# Patient Record
Sex: FEMALE | Race: WHITE | Employment: OTHER | URBAN - METROPOLITAN AREA
[De-identification: names, ages, dates, MRNs, and addresses within clinical notes are randomized per-mention and may not be internally consistent; named-entity substitution may affect disease eponyms.]

---

## 2019-09-06 ENCOUNTER — APPOINTMENT (OUTPATIENT)
Dept: GENERAL RADIOLOGY | Age: 79
DRG: 291 | End: 2019-09-06
Payer: MEDICARE

## 2019-09-06 ENCOUNTER — HOSPITAL ENCOUNTER (INPATIENT)
Age: 79
LOS: 1 days | Discharge: ANOTHER HOSPITAL | DRG: 291 | End: 2019-09-06
Attending: EMERGENCY MEDICINE | Admitting: HOSPITALIST
Payer: MEDICARE

## 2019-09-06 VITALS
TEMPERATURE: 97.8 F | RESPIRATION RATE: 21 BRPM | HEART RATE: 86 BPM | WEIGHT: 106.92 LBS | OXYGEN SATURATION: 100 % | HEIGHT: 60 IN | BODY MASS INDEX: 20.99 KG/M2 | DIASTOLIC BLOOD PRESSURE: 62 MMHG | SYSTOLIC BLOOD PRESSURE: 176 MMHG

## 2019-09-06 DIAGNOSIS — R79.89 ELEVATED D-DIMER: ICD-10-CM

## 2019-09-06 DIAGNOSIS — T50.905A ADVERSE EFFECT OF DRUG, INITIAL ENCOUNTER: ICD-10-CM

## 2019-09-06 DIAGNOSIS — J96.01 ACUTE RESPIRATORY FAILURE WITH HYPOXIA AND HYPERCAPNIA (HCC): Primary | ICD-10-CM

## 2019-09-06 DIAGNOSIS — N17.9 AKI (ACUTE KIDNEY INJURY) (HCC): ICD-10-CM

## 2019-09-06 DIAGNOSIS — R00.1 BRADYCARDIA: ICD-10-CM

## 2019-09-06 DIAGNOSIS — I50.9 ACUTE CONGESTIVE HEART FAILURE, UNSPECIFIED HEART FAILURE TYPE (HCC): ICD-10-CM

## 2019-09-06 DIAGNOSIS — J96.02 ACUTE RESPIRATORY FAILURE WITH HYPOXIA AND HYPERCAPNIA (HCC): Primary | ICD-10-CM

## 2019-09-06 DIAGNOSIS — I16.1 HYPERTENSIVE EMERGENCY: ICD-10-CM

## 2019-09-06 PROBLEM — J96.00 ACUTE RESPIRATORY FAILURE (HCC): Status: ACTIVE | Noted: 2019-09-06

## 2019-09-06 LAB
A/G RATIO: 1.2 (ref 1.1–2.2)
ALBUMIN SERPL-MCNC: 3.7 G/DL (ref 3.4–5)
ALP BLD-CCNC: 99 U/L (ref 40–129)
ALT SERPL-CCNC: 13 U/L (ref 10–40)
ANION GAP SERPL CALCULATED.3IONS-SCNC: 11 MMOL/L (ref 3–16)
AST SERPL-CCNC: 17 U/L (ref 15–37)
BASE EXCESS ARTERIAL: -1.3 MMOL/L (ref -3–3)
BASE EXCESS VENOUS: 1.6 MMOL/L
BASOPHILS ABSOLUTE: 0.1 K/UL (ref 0–0.2)
BASOPHILS RELATIVE PERCENT: 0.9 %
BILIRUB SERPL-MCNC: 0.5 MG/DL (ref 0–1)
BILIRUBIN URINE: NEGATIVE
BLOOD, URINE: ABNORMAL
BUN BLDV-MCNC: 39 MG/DL (ref 7–20)
CALCIUM SERPL-MCNC: 10.9 MG/DL (ref 8.3–10.6)
CARBOXYHEMOGLOBIN ARTERIAL: 0.9 % (ref 0–1.5)
CARBOXYHEMOGLOBIN: 2.2 %
CHLORIDE BLD-SCNC: 103 MMOL/L (ref 99–110)
CLARITY: ABNORMAL
CO2: 26 MMOL/L (ref 21–32)
COLOR: YELLOW
COMMENT UA: ABNORMAL
CREAT SERPL-MCNC: 2.4 MG/DL (ref 0.6–1.2)
D DIMER: 427 NG/ML DDU (ref 0–229)
EKG ATRIAL RATE: 36 BPM
EKG ATRIAL RATE: 69 BPM
EKG ATRIAL RATE: 90 BPM
EKG DIAGNOSIS: NORMAL
EKG P AXIS: 62 DEGREES
EKG P AXIS: 76 DEGREES
EKG P-R INTERVAL: 136 MS
EKG P-R INTERVAL: 146 MS
EKG Q-T INTERVAL: 382 MS
EKG Q-T INTERVAL: 494 MS
EKG Q-T INTERVAL: 502 MS
EKG QRS DURATION: 86 MS
EKG QRS DURATION: 88 MS
EKG QRS DURATION: 92 MS
EKG QTC CALCULATION (BAZETT): 429 MS
EKG QTC CALCULATION (BAZETT): 467 MS
EKG QTC CALCULATION (BAZETT): 529 MS
EKG R AXIS: 15 DEGREES
EKG R AXIS: 15 DEGREES
EKG R AXIS: 8 DEGREES
EKG T AXIS: 20 DEGREES
EKG T AXIS: 56 DEGREES
EKG T AXIS: 9 DEGREES
EKG VENTRICULAR RATE: 44 BPM
EKG VENTRICULAR RATE: 69 BPM
EKG VENTRICULAR RATE: 90 BPM
EOSINOPHILS ABSOLUTE: 0.8 K/UL (ref 0–0.6)
EOSINOPHILS RELATIVE PERCENT: 5.9 %
EPITHELIAL CELLS, UA: 10 /HPF (ref 0–5)
GFR AFRICAN AMERICAN: 23
GFR NON-AFRICAN AMERICAN: 19
GLOBULIN: 3.1 G/DL
GLUCOSE BLD-MCNC: 269 MG/DL (ref 70–99)
GLUCOSE URINE: 250 MG/DL
HCO3 ARTERIAL: 26.1 MMOL/L (ref 21–29)
HCO3 VENOUS: 28 MMOL/L (ref 23–29)
HCT VFR BLD CALC: 30.4 % (ref 36–48)
HEMOGLOBIN, ART, EXTENDED: 9.6 G/DL (ref 12–16)
HEMOGLOBIN: 9.6 G/DL (ref 12–16)
KETONES, URINE: NEGATIVE MG/DL
LEUKOCYTE ESTERASE, URINE: ABNORMAL
LIPASE: 92 U/L (ref 13–60)
LYMPHOCYTES ABSOLUTE: 1 K/UL (ref 1–5.1)
LYMPHOCYTES RELATIVE PERCENT: 7.2 %
MCH RBC QN AUTO: 27.1 PG (ref 26–34)
MCHC RBC AUTO-ENTMCNC: 31.6 G/DL (ref 31–36)
MCV RBC AUTO: 85.7 FL (ref 80–100)
METHEMOGLOBIN ARTERIAL: 0.7 %
METHEMOGLOBIN VENOUS: 0.9 %
MICROSCOPIC EXAMINATION: YES
MONOCYTES ABSOLUTE: 0.7 K/UL (ref 0–1.3)
MONOCYTES RELATIVE PERCENT: 5.4 %
NEUTROPHILS ABSOLUTE: 10.9 K/UL (ref 1.7–7.7)
NEUTROPHILS RELATIVE PERCENT: 80.6 %
NITRITE, URINE: NEGATIVE
O2 CONTENT ARTERIAL: 14 ML/DL
O2 CONTENT, VEN: 11 ML/DL
O2 SAT, ARTERIAL: 99.2 %
O2 SAT, VEN: 87 %
O2 THERAPY: ABNORMAL
O2 THERAPY: ABNORMAL
PCO2 ARTERIAL: 62 MMHG (ref 35–45)
PCO2, VEN: 61.6 MMHG (ref 40–50)
PDW BLD-RTO: 15.2 % (ref 12.4–15.4)
PH ARTERIAL: 7.25 (ref 7.35–7.45)
PH UA: 6 (ref 5–8)
PH VENOUS: 7.29 (ref 7.35–7.45)
PLATELET # BLD: 132 K/UL (ref 135–450)
PMV BLD AUTO: 12.2 FL (ref 5–10.5)
PO2 ARTERIAL: 166 MMHG (ref 75–108)
PO2, VEN: 54 MMHG
POTASSIUM REFLEX MAGNESIUM: 4.3 MMOL/L (ref 3.5–5.1)
PRO-BNP: 8446 PG/ML (ref 0–449)
PROTEIN UA: >=300 MG/DL
RBC # BLD: 3.55 M/UL (ref 4–5.2)
RBC UA: 43 /HPF (ref 0–4)
SODIUM BLD-SCNC: 140 MMOL/L (ref 136–145)
SPECIFIC GRAVITY UA: 1.01 (ref 1–1.03)
TCO2 ARTERIAL: 28 MMOL/L
TCO2 CALC VENOUS: 30 MMOL/L
TOTAL PROTEIN: 6.8 G/DL (ref 6.4–8.2)
TROPONIN: 0.02 NG/ML
URINE REFLEX TO CULTURE: YES
URINE TYPE: ABNORMAL
UROBILINOGEN, URINE: 0.2 E.U./DL
WBC # BLD: 13.5 K/UL (ref 4–11)
WBC UA: >900 /HPF (ref 0–5)

## 2019-09-06 PROCEDURE — 36415 COLL VENOUS BLD VENIPUNCTURE: CPT

## 2019-09-06 PROCEDURE — 71045 X-RAY EXAM CHEST 1 VIEW: CPT

## 2019-09-06 PROCEDURE — 84484 ASSAY OF TROPONIN QUANT: CPT

## 2019-09-06 PROCEDURE — 99291 CRITICAL CARE FIRST HOUR: CPT

## 2019-09-06 PROCEDURE — 6360000002 HC RX W HCPCS: Performed by: HOSPITALIST

## 2019-09-06 PROCEDURE — 87150 DNA/RNA AMPLIFIED PROBE: CPT

## 2019-09-06 PROCEDURE — 2000000000 HC ICU R&B

## 2019-09-06 PROCEDURE — 96375 TX/PRO/DX INJ NEW DRUG ADDON: CPT

## 2019-09-06 PROCEDURE — 80053 COMPREHEN METABOLIC PANEL: CPT

## 2019-09-06 PROCEDURE — 93010 ELECTROCARDIOGRAM REPORT: CPT | Performed by: INTERNAL MEDICINE

## 2019-09-06 PROCEDURE — 36600 WITHDRAWAL OF ARTERIAL BLOOD: CPT

## 2019-09-06 PROCEDURE — 2700000000 HC OXYGEN THERAPY PER DAY

## 2019-09-06 PROCEDURE — 85025 COMPLETE CBC W/AUTO DIFF WBC: CPT

## 2019-09-06 PROCEDURE — 2580000003 HC RX 258: Performed by: HOSPITALIST

## 2019-09-06 PROCEDURE — 82803 BLOOD GASES ANY COMBINATION: CPT

## 2019-09-06 PROCEDURE — 2500000003 HC RX 250 WO HCPCS: Performed by: EMERGENCY MEDICINE

## 2019-09-06 PROCEDURE — 94761 N-INVAS EAR/PLS OXIMETRY MLT: CPT

## 2019-09-06 PROCEDURE — 6370000000 HC RX 637 (ALT 250 FOR IP): Performed by: EMERGENCY MEDICINE

## 2019-09-06 PROCEDURE — 6360000002 HC RX W HCPCS

## 2019-09-06 PROCEDURE — 94660 CPAP INITIATION&MGMT: CPT

## 2019-09-06 PROCEDURE — 94640 AIRWAY INHALATION TREATMENT: CPT

## 2019-09-06 PROCEDURE — 83690 ASSAY OF LIPASE: CPT

## 2019-09-06 PROCEDURE — 93005 ELECTROCARDIOGRAM TRACING: CPT | Performed by: EMERGENCY MEDICINE

## 2019-09-06 PROCEDURE — 85379 FIBRIN DEGRADATION QUANT: CPT

## 2019-09-06 PROCEDURE — 96374 THER/PROPH/DIAG INJ IV PUSH: CPT

## 2019-09-06 PROCEDURE — 87086 URINE CULTURE/COLONY COUNT: CPT

## 2019-09-06 PROCEDURE — 87040 BLOOD CULTURE FOR BACTERIA: CPT

## 2019-09-06 PROCEDURE — 6360000002 HC RX W HCPCS: Performed by: EMERGENCY MEDICINE

## 2019-09-06 PROCEDURE — 81001 URINALYSIS AUTO W/SCOPE: CPT

## 2019-09-06 PROCEDURE — 51702 INSERT TEMP BLADDER CATH: CPT

## 2019-09-06 PROCEDURE — 83880 ASSAY OF NATRIURETIC PEPTIDE: CPT

## 2019-09-06 PROCEDURE — 96376 TX/PRO/DX INJ SAME DRUG ADON: CPT

## 2019-09-06 RX ORDER — SODIUM CHLORIDE 0.9 % (FLUSH) 0.9 %
10 SYRINGE (ML) INJECTION EVERY 12 HOURS SCHEDULED
Status: DISCONTINUED | OUTPATIENT
Start: 2019-09-06 | End: 2019-09-06 | Stop reason: HOSPADM

## 2019-09-06 RX ORDER — ATROPINE SULFATE 0.1 MG/ML
0.5 INJECTION INTRAVENOUS ONCE
Status: COMPLETED | OUTPATIENT
Start: 2019-09-06 | End: 2019-09-06

## 2019-09-06 RX ORDER — IPRATROPIUM BROMIDE AND ALBUTEROL SULFATE 2.5; .5 MG/3ML; MG/3ML
1 SOLUTION RESPIRATORY (INHALATION) 2 TIMES DAILY
COMMUNITY

## 2019-09-06 RX ORDER — FORMOTEROL FUMARATE 20 UG/2ML
20 SOLUTION RESPIRATORY (INHALATION) 2 TIMES DAILY
Status: DISCONTINUED | OUTPATIENT
Start: 2019-09-06 | End: 2019-09-06 | Stop reason: HOSPADM

## 2019-09-06 RX ORDER — IPRATROPIUM BROMIDE AND ALBUTEROL SULFATE 2.5; .5 MG/3ML; MG/3ML
1 SOLUTION RESPIRATORY (INHALATION) 2 TIMES DAILY
Status: DISCONTINUED | OUTPATIENT
Start: 2019-09-06 | End: 2019-09-06 | Stop reason: HOSPADM

## 2019-09-06 RX ORDER — DEXTROSE MONOHYDRATE 25 G/50ML
12.5 INJECTION, SOLUTION INTRAVENOUS PRN
Status: DISCONTINUED | OUTPATIENT
Start: 2019-09-06 | End: 2019-09-06 | Stop reason: HOSPADM

## 2019-09-06 RX ORDER — DEXTROSE MONOHYDRATE 50 MG/ML
100 INJECTION, SOLUTION INTRAVENOUS PRN
Status: DISCONTINUED | OUTPATIENT
Start: 2019-09-06 | End: 2019-09-06 | Stop reason: HOSPADM

## 2019-09-06 RX ORDER — INSULIN GLARGINE 100 [IU]/ML
INJECTION, SOLUTION SUBCUTANEOUS
Refills: 12 | COMMUNITY
Start: 2019-07-25

## 2019-09-06 RX ORDER — LINAGLIPTIN 5 MG/1
TABLET, FILM COATED ORAL
Refills: 3 | COMMUNITY
Start: 2019-07-08

## 2019-09-06 RX ORDER — NICOTINE POLACRILEX 4 MG
15 LOZENGE BUCCAL PRN
Status: DISCONTINUED | OUTPATIENT
Start: 2019-09-06 | End: 2019-09-06 | Stop reason: HOSPADM

## 2019-09-06 RX ORDER — GLIMEPIRIDE 4 MG/1
TABLET ORAL
COMMUNITY
Start: 2015-05-09 | End: 2019-09-06

## 2019-09-06 RX ORDER — INSULIN GLARGINE 100 [IU]/ML
20 INJECTION, SOLUTION SUBCUTANEOUS NIGHTLY
Status: DISCONTINUED | OUTPATIENT
Start: 2019-09-06 | End: 2019-09-06 | Stop reason: HOSPADM

## 2019-09-06 RX ORDER — ALBUTEROL SULFATE 90 UG/1
2 AEROSOL, METERED RESPIRATORY (INHALATION)
COMMUNITY
Start: 2019-06-21

## 2019-09-06 RX ORDER — PIOGLITAZONEHYDROCHLORIDE 15 MG/1
15 TABLET ORAL
COMMUNITY
End: 2019-09-06

## 2019-09-06 RX ORDER — CLONIDINE HYDROCHLORIDE 0.2 MG/1
TABLET ORAL
COMMUNITY
Start: 2015-05-20 | End: 2019-09-06

## 2019-09-06 RX ORDER — PANTOPRAZOLE SODIUM 40 MG/1
40 TABLET, DELAYED RELEASE ORAL
Status: DISCONTINUED | OUTPATIENT
Start: 2019-09-07 | End: 2019-09-06 | Stop reason: HOSPADM

## 2019-09-06 RX ORDER — HYDRALAZINE HYDROCHLORIDE 20 MG/ML
10 INJECTION INTRAMUSCULAR; INTRAVENOUS EVERY 4 HOURS PRN
Status: DISCONTINUED | OUTPATIENT
Start: 2019-09-06 | End: 2019-09-06 | Stop reason: HOSPADM

## 2019-09-06 RX ORDER — CARVEDILOL 6.25 MG/1
TABLET ORAL
Refills: 5 | COMMUNITY
Start: 2019-08-10

## 2019-09-06 RX ORDER — LISINOPRIL 5 MG/1
5 TABLET ORAL DAILY
Status: DISCONTINUED | OUTPATIENT
Start: 2019-09-06 | End: 2019-09-06 | Stop reason: HOSPADM

## 2019-09-06 RX ORDER — SPIRONOLACTONE 25 MG/1
25 TABLET ORAL DAILY
Status: DISCONTINUED | OUTPATIENT
Start: 2019-09-06 | End: 2019-09-06 | Stop reason: HOSPADM

## 2019-09-06 RX ORDER — ATROPINE SULFATE 0.1 MG/ML
INJECTION INTRAVENOUS
Status: COMPLETED
Start: 2019-09-06 | End: 2019-09-06

## 2019-09-06 RX ORDER — FUROSEMIDE 10 MG/ML
40 INJECTION INTRAMUSCULAR; INTRAVENOUS 2 TIMES DAILY
Status: DISCONTINUED | OUTPATIENT
Start: 2019-09-06 | End: 2019-09-06 | Stop reason: HOSPADM

## 2019-09-06 RX ORDER — SPIRONOLACTONE 25 MG/1
TABLET ORAL
Refills: 11 | COMMUNITY
Start: 2019-09-04

## 2019-09-06 RX ORDER — CLOPIDOGREL BISULFATE 75 MG/1
75 TABLET ORAL
COMMUNITY
Start: 2018-11-28 | End: 2019-09-06

## 2019-09-06 RX ORDER — FUROSEMIDE 20 MG/1
20 TABLET ORAL
COMMUNITY
Start: 2018-01-09

## 2019-09-06 RX ORDER — CARVEDILOL 6.25 MG/1
6.25 TABLET ORAL 2 TIMES DAILY WITH MEALS
Status: DISCONTINUED | OUTPATIENT
Start: 2019-09-06 | End: 2019-09-06 | Stop reason: HOSPADM

## 2019-09-06 RX ORDER — SODIUM CHLORIDE 0.9 % (FLUSH) 0.9 %
10 SYRINGE (ML) INJECTION PRN
Status: DISCONTINUED | OUTPATIENT
Start: 2019-09-06 | End: 2019-09-06 | Stop reason: HOSPADM

## 2019-09-06 RX ORDER — ONDANSETRON 2 MG/ML
4 INJECTION INTRAMUSCULAR; INTRAVENOUS EVERY 6 HOURS PRN
Status: DISCONTINUED | OUTPATIENT
Start: 2019-09-06 | End: 2019-09-06 | Stop reason: HOSPADM

## 2019-09-06 RX ORDER — METOPROLOL SUCCINATE 25 MG/1
25 TABLET, EXTENDED RELEASE ORAL DAILY
Status: CANCELLED | OUTPATIENT
Start: 2019-09-06

## 2019-09-06 RX ORDER — METOPROLOL SUCCINATE 50 MG/1
TABLET, EXTENDED RELEASE ORAL
COMMUNITY
Start: 2018-01-23 | End: 2019-09-06

## 2019-09-06 RX ORDER — ASPIRIN 81 MG/1
81 TABLET, CHEWABLE ORAL DAILY
Status: DISCONTINUED | OUTPATIENT
Start: 2019-09-06 | End: 2019-09-06 | Stop reason: HOSPADM

## 2019-09-06 RX ORDER — DILTIAZEM HYDROCHLORIDE 5 MG/ML
10 INJECTION INTRAVENOUS ONCE
Status: COMPLETED | OUTPATIENT
Start: 2019-09-06 | End: 2019-09-06

## 2019-09-06 RX ORDER — AMLODIPINE BESYLATE 10 MG/1
TABLET ORAL
COMMUNITY
Start: 2015-03-23 | End: 2019-09-06

## 2019-09-06 RX ORDER — OMEPRAZOLE 20 MG/1
20 CAPSULE, DELAYED RELEASE ORAL DAILY
COMMUNITY

## 2019-09-06 RX ORDER — CELECOXIB 200 MG/1
CAPSULE ORAL
COMMUNITY
Start: 2013-10-27 | End: 2019-09-06

## 2019-09-06 RX ORDER — IPRATROPIUM BROMIDE AND ALBUTEROL SULFATE 2.5; .5 MG/3ML; MG/3ML
1 SOLUTION RESPIRATORY (INHALATION) ONCE
Status: COMPLETED | OUTPATIENT
Start: 2019-09-06 | End: 2019-09-06

## 2019-09-06 RX ADMIN — ATROPINE SULFATE 0.5 MG: 0.1 INJECTION INTRAVENOUS at 08:54

## 2019-09-06 RX ADMIN — ATROPINE SULFATE 0.5 MG: 0.1 INJECTION, SOLUTION INTRAVENOUS at 08:54

## 2019-09-06 RX ADMIN — DILTIAZEM HYDROCHLORIDE 10 MG: 5 INJECTION INTRAVENOUS at 08:27

## 2019-09-06 RX ADMIN — IPRATROPIUM BROMIDE AND ALBUTEROL SULFATE 1 AMPULE: .5; 3 SOLUTION RESPIRATORY (INHALATION) at 08:30

## 2019-09-06 RX ADMIN — ATROPINE SULFATE 0.5 MG: 0.1 INJECTION, SOLUTION INTRAVENOUS at 09:15

## 2019-09-06 RX ADMIN — ATROPINE SULFATE 0.5 MG: 0.1 INJECTION PARENTERAL at 08:37

## 2019-09-06 RX ADMIN — CEFTRIAXONE 1 G: 1 INJECTION, POWDER, FOR SOLUTION INTRAMUSCULAR; INTRAVENOUS at 15:00

## 2019-09-06 RX ADMIN — ATROPINE SULFATE 0.5 MG: 0.1 INJECTION INTRAVENOUS at 08:37

## 2019-09-06 ASSESSMENT — PAIN DESCRIPTION - PAIN TYPE: TYPE: CHRONIC PAIN

## 2019-09-06 ASSESSMENT — PAIN DESCRIPTION - ORIENTATION: ORIENTATION: RIGHT

## 2019-09-06 ASSESSMENT — PAIN DESCRIPTION - LOCATION: LOCATION: FOOT

## 2019-09-06 ASSESSMENT — PAIN SCALES - GENERAL: PAINLEVEL_OUTOF10: 9

## 2019-09-06 ASSESSMENT — PAIN DESCRIPTION - PROGRESSION: CLINICAL_PROGRESSION: NOT CHANGED

## 2019-09-06 NOTE — ED NOTES
After administering cardizem pt became bradycardic pt hr 44 at one point pt had a 2 second pause md dela cruz aware and  at bedside verbal oders for atropine 0.5mg.     Anastasiya Rosa, CARLENE  09/06/19 [address] [facility],[address], RN  09/06/19 2717

## 2019-09-06 NOTE — ED PROVIDER NOTES
[facility]      Pt Name: Reuben Benoit  MRN: [id number]  [place] 1940  Date of evaluation: 9/6/2019  Provider: Angeline Chan, [address] [place] [address]  Chief Complaint   Patient presents with    Shortness of Breath     pt on cpap on arrival o2 sat 96% at home pt was 46%  pt responding on arrival although squad stated when arrived to home pt unresponsive. HPI  Reuben Benoit is a [de-identified] y.o. female who presents with being found unconscious by EMS. Pulse ox was 48%. Patient had been complaining of shortness of breath.  called 46. Patient was placed on BiPAP on EMS arrival.  She was more alert when he got to the emergency department but still lethargic. She denies any chest pain. She denies any nausea vomiting. She been short of breath for a few days. She describes her symptoms as severe. She is a poor historian. REVIEW OF SYSTEMS    All systems negative except as noted in the HPI. Reviewed Nurses' notes and concur. History limited due to BiPAP and decreased level consciousness. No LMP recorded. PAST MEDICAL HISTORY  Past Medical History:   Diagnosis Date    [facility] [address]     Diabetes mellitus ([facility] [address] [facility]     Hyperlipidemia     Hypertension        FAMILY HISTORY  No family history on file. SOCIAL HISTORY       SURGICAL HISTORY  No past surgical history on file.     CURRENT MEDICATIONS  Current Outpatient Rx   Medication Sig Dispense Refill    amLODIPine (NORVASC) 10 MG tablet       albuterol sulfate HFA (PROAIR HFA) 108 (90 Base) MCG/ACT inhaler Inhale 2 puffs into the lungs      aspirin 81 MG tablet TAKE ONE CAPSULE BY MOUTH DAILY      clopidogrel (PLAVIX) 75 MG tablet Take 75 mg by mouth      cloNIDine (CATAPRES) 0.2 MG tablet       celecoxib (CELEBREX) 200 MG capsule       furosemide (LASIX) 20 MG tablet Take 20 mg by mouth      glimepiride (AMARYL) 4 MG tablet       metoprolol succinate (TOPROL XL) 50 MG extended release tablet TK 1 T PO  QD      carvedilol (COREG) 6.25 MG tablet TK 1 T PO BID  5    BASAGLAR KWIKPEN 100 UNIT/ML injection pen INJECT  25 UNITS QAM  12    TRADJENTA 5 MG tablet TK 1 T PO D  3    spironolactone (ALDACTONE) 25 MG tablet TK 1/2 T PO D  11    pioglitazone (ACTOS) 15 MG tablet Take 15 mg by mouth         ALLERGIES  Allergies   Allergen Reactions    Ticagrelor Shortness Of Breath    Oxycodone-Acetaminophen Nausea And Vomiting         PHYSICAL EXAM  VITAL SIGNS: BP (!) 148/52   Pulse 56   Resp 18   Wt 113 lb 12.1 oz (51.6 kg)   SpO2 100%   Constitutional: Well-developed, well-nourished, appears in severe respiratory distress, nontoxic, activity: Lying on the cart, BiPAP in place, responds minimally to verbal stimulus  HENT: Normocephalic, Atraumatic, Bilateral external ears normal, TM's were normal, Mucus membranes are moist and oropharynx is patent and radha, No oral exudates, Nose normal.  Eyes: PERRLA, EOMI, Conjunctiva normal, No discharge. No scleral icterus. Neck: Normal range of motion, No tenderness, Supple. Lymphatic: No lymphadenopathy noted. Cardiovascular: Normal heart rate, Normal rhythm, no murmurs, no gallops, no rubs. Thorax & Lungs: Decreased breath sounds, no respiratory distress, no wheezing, no rales, no rhonchi  Abdomen: Soft, Nontender, No hepatosplenomegaly, No masses, No pulsatile masses, No distension, normal bowel sounds  Skin: Warm, Dry, No erythema, No rash. Extremities: No edema, No tenderness, No cyanosis, No clubbing. No amputations, capillary refill less than 2 seconds. Neurologic: Alert & oriented x 1, CN II through XII are intact, normal motor function, normal sensory function, no focal deficits noted, no clonus, no tremors. Psychiatric: Affect flattened, Mood depressed and anxious. ?   LABORATORY  Labs Reviewed   CBC WITH AUTO DIFFERENTIAL - Abnormal; Notable for the following components:       Result BLOOD GAS, ARTERIAL - Abnormal; Notable for the following components:    pH, Arterial 7.247 (*)     pCO2, Arterial 62.0 (*)     pO2, Arterial 166.0 (*)     Hemoglobin, Art, Extended 9.6 (*)     All other components within normal limits    Narrative:     Performed at:  [facility]  [address] [address]   Phone [phone]   URINE RT REFLEX TO CULTURE - Abnormal; Notable for the following components:    Clarity, UA TURBID (*)     Glucose, Ur 250 (*)     Blood, Urine MODERATE (*)     Protein, UA >=300 (*)     Leukocyte Esterase, Urine LARGE (*)     All other components within normal limits    Narrative:     Performed at:  [facility]  [address] [address]   Phone [phone]   CULTURE BLOOD #1   CULTURE BLOOD #2   URINE CULTURE   BLOOD GAS, VENOUS   MICROSCOPIC URINALYSIS       ?   EKG  EKG Interpretation    Interpreted by emergency department physician  Time read: 2663    Rhythm: Sinus  Ventricular Rate: 90  QRS Axis: 15  Ectopy: None noted  Conduction: Normal sinus rhythm with left atrial enlargement  ST Segments: Nonspecific abnormalities  T Waves: Nonspecific abnormalities  Q Waves: Lead III only    Other findings: None    Compared to EKG on: None to compare    Clinical Impression: Normal sinus rhythm with left atrial enlargement and nonspecific ST-T wave abnormalities    DEON KNIGHT  EKG Interpretation #2 (after Cardizem)    Interpreted by emergency department physician  Time read: 0901    Rhythm: Junctional  Ventricular Rate: 44  QRS Axis: 8  Ectopy: Occasional PVC  Conduction: Junctional bradycardia with occasional PVCs  ST Segments: normal  T Waves: normal  Q Waves: Lead III only    Other findings: None    Compared to EKG on: 0816 (1 hour earlier)    Clinical Impression: Junctional bradycardia with occasional PVCs, this is probably secondary to Cardizem administration for her

## 2019-09-06 NOTE — H&P
Hospitalist  History and Physical    Patient:  Noe Yan  MRN: [id number]  PCP: Vertis Ahumada    CHIEF COMPLAINT:  Shortness of Breath      HISTORY OF PRESENT ILLNESS:   The patient Noe Yan is a [de-identified] y. o.female with medical history significant for CHF  Diabetes mellitus hypertension and hyperlipidemia. Patient presented to the emergency room by emergency medical services  When patient was found by her  unresponsive in the morning before she woke up  Patient uses BiPAP at nighttime. The  called [phone] and when emergency medical services arrived and they noticed the patient's pulse ox as 48%. Patient was brought to the emergency room where the patient was put on BiPAP and patient's pulse ox improved. Patient was admitted to the intensive care unit but family requests patient's transfer to her primary  Physician at [facility].      Past Medical History:        Diagnosis Date    [facility] [address]     Diabetes mellitus ([facility] [address]     Hyperlipidemia     Hypertension        Past Surgical History:    No past surgical history on file. Medications Prior to Admission:    Prior to Admission medications    Medication Sig Start Date End Date Taking?  Authorizing Provider   aspirin 81 MG tablet TAKE ONE CAPSULE BY MOUTH DAILY 5/1/19  Yes Historical Provider, MD   carvedilol (COREG) 6.25 MG tablet TK 1 T PO BID 8/10/19  Yes Historical Provider, MD CHELA MEDEROS 100 UNIT/ML injection pen INJECT  25 UNITS QAM 7/25/19  Yes Historical Provider, MD   TRADJENTA 5 MG tablet TK 1 T PO D 7/8/19  Yes Historical Provider, MD   spironolactone (ALDACTONE) 25 MG tablet TK 1/2 T PO D 9/4/19  Yes Historical Provider, MD   umeclidinium-vilanterol (ANORO ELLIPTA) 62.5-25 MCG/INH AEPB inhaler Inhale 1 puff into the lungs daily   Yes Historical Provider, MD   omeprazole (PRILOSEC) 20 MG delayed release capsule Take 20 mg by mouth daily   Yes Historical Provider, MD Retention. GYNECOLOGICAL: vaginal bleeding , vaginal discharge, menopause    MUSCULOSKELETAL:       joint swelling or stiffness, joint pain, muscle pain, balance problems, low back pain. NEUROLOGICAL:      Gait problems. Tremor. Dizziness. Pain and paresthesias, weakness in extremities. Seizures, memory loss    PSYCHLOGICAL:        Anxiety, depression    SKIN :      Rashes ulcers, skin color changes, easy bruisability, lymphadenopathy      Physical Exam:      Vitals: BP (!) 194/56   Pulse 69   Temp 97.8 °F (36.6 °C) (Axillary)   Resp 21   Ht 5' (1.524 m)   Wt 106 lb 14.8 oz (48.5 kg)   SpO2 100%   BMI 20.88 kg/m²     Gen:          Alert and oriented x3  Eyes: PERRL. No sclera icterus. No conjunctival injection. ENT: No discharge. Pharynx clear. External appearance of ears and nose normal.  Neck: Trachea midline. No obvious mass. Resp: decreased breath sounds bilaterally  CV: Regular rate. Regular rhythm. No murmur or rub. No edema. GI: Non-tender. Non-distended. No hernia. Skin: Warm, dry, normal texture and turgor. Lymph: No cervical LAD. No supraclavicular LAD. M/S: / Ext. No cyanosis. No clubbing. No joint deformity. Neuro: Moves all four extremities. CN 2-12 tested, no deficits noted. Peripheral pulses and capillary refill is intact. CBC:   Recent Labs     09/06/19  0856   WBC 13.5*   HGB 9.6*   *     BMP:    Recent Labs     09/06/19  0856      K 4.3      CO2 26   BUN 39*   CREATININE 2.4*   GLUCOSE 269*     Hepatic:   Recent Labs     09/06/19  0856   AST 17   ALT 13   BILITOT 0.5   ALKPHOS 99     Troponin:   Recent Labs     09/06/19  0856   TROPONINI 0.02*     BNP: No results for input(s): BNP in the last 72 hours. INR: No results for input(s): INR in the last 72 hours. No results found for: LABA1C        No results for input(s): CKTOTAL in the last 72 hours.   -----------------------------------------------------------------  XR CHEST PORTABLE   1.  Diffuse interstitial thickening in the lungs, favored to be related to   chronic interstitial change.  Possibility of interstitial edema or atypical   pneumonia not entirely excluded.  No comparison studies are available. Short-term follow-up chest radiographs are recommended.       2.  Questionable small right pleural effusion.           EKG  Normal sinus rhythmPossible    Chest x-ray  Consistent with interstitial edema or atypical pneumonia    Assessment / Plan     Acute respiratory failure  Patient was started on BiPAP  Start patient on diuretics      hypertension  10 mg of diltiazem IV  Lasix    Urinary tract infection  Start patient on ceftriaxone    DVT and GI prophylaxis      No Order      Shira Mcduffie M.D    This note was transcribed using [employer] Please disregard any translational errors.

## 2019-09-06 NOTE — PROGRESS NOTES
Pharmacy Medication Reconciliation Note     List of medications patient is currently taking is complete. Source of information:   1. List from home brought in by patient's   2.  EMR    Allergies   Allergen Reactions    Ticagrelor Shortness Of Breath    Oxycodone-Acetaminophen Nausea And Vomiting       Charles Hess PharmD, BCPS  9/6/2019  1:07 PM

## 2019-09-06 NOTE — ED NOTES
Pt bradycardic md dela cruz aware a verbal order for another 0.5mg of atropine to be given      Ophelia John RN  09/06/19 [id number]

## 2019-09-07 LAB
REPORT: NORMAL
URINE CULTURE, ROUTINE: NORMAL

## 2019-09-08 NOTE — RESULT ENCOUNTER NOTE
Culture reviewed, please contact patient and inform them of the results and have them return to the ED for evaluation.

## 2019-09-10 LAB — CULTURE, BLOOD 2: ABNORMAL

## 2019-09-11 LAB — BLOOD CULTURE, ROUTINE: NORMAL

## 2020-11-03 ENCOUNTER — HOSPITAL ENCOUNTER (OUTPATIENT)
Age: 80
End: 2020-11-03